# Patient Record
Sex: MALE
[De-identification: names, ages, dates, MRNs, and addresses within clinical notes are randomized per-mention and may not be internally consistent; named-entity substitution may affect disease eponyms.]

---

## 2021-05-26 ENCOUNTER — APPOINTMENT (OUTPATIENT)
Dept: ENDOCRINOLOGY | Facility: CLINIC | Age: 41
End: 2021-05-26
Payer: MEDICAID

## 2021-05-26 VITALS
DIASTOLIC BLOOD PRESSURE: 73 MMHG | HEIGHT: 71 IN | HEART RATE: 60 BPM | SYSTOLIC BLOOD PRESSURE: 124 MMHG | BODY MASS INDEX: 23.24 KG/M2 | WEIGHT: 166 LBS

## 2021-05-26 PROBLEM — Z00.00 ENCOUNTER FOR PREVENTIVE HEALTH EXAMINATION: Status: ACTIVE | Noted: 2021-05-26

## 2021-05-26 LAB — GLUCOSE BLDC GLUCOMTR-MCNC: 254

## 2021-05-26 PROCEDURE — 82962 GLUCOSE BLOOD TEST: CPT

## 2021-05-26 PROCEDURE — 99205 OFFICE O/P NEW HI 60 MIN: CPT | Mod: 25

## 2021-05-27 NOTE — END OF VISIT
[FreeTextEntry3] : All medical record entries made by the Scribe were at my, Dr. Jorge Barraza, direction and personally dictated by me on 05/26/2021. I have reviewed the chart and agree that the record accurately reflects my personal performance of the history, physical exam, assessment and plan. I have also personally directed, reviewed and agreed with the chart.  [Time Spent: ___ minutes] : I have spent [unfilled] minutes of time on the encounter.

## 2021-05-27 NOTE — HISTORY OF PRESENT ILLNESS
[FreeTextEntry1] : 41 y/o M pt, with Hx of T2DM (dx in 2014), referred by Dr. Wale Hollingsworth, presents today to establish endocrine care with me.\par FHx: Heart disease (father)\par SHx: Non smoker. Social EtOH use. No recreational drug use. Works in cleaning and maintenance. \par Lifestyle: Walks qd. Eats 3 meals a day. Checks BS. \par Last [facility] visit: 2015\par \par 05/26/2021\par Pt was hospitalized for a week when he got dx with T2DM in 2014, and he was discharged with insulin. He was taking NovoLog and Lantus 10 u qpm for the first year of dx. He was on Metformin 2 yrs ago which was held in 7/2020 after he started experiencing dizziness. Pt was hospitalized for 3 days two weeks ago because he was feeling weak and vomiting. BS was found to be in 400s and notes it is probably due to "eating wrong things and stressing a lot". He was reportedly advised to adjust Levemir to 10 u and to continue with NovoLog. Pt states that he has been treated like T1DM and wants to know what kind of DM he has. He has never seen an endocrinologist before. \par \par Pt presents today with POCT 254, /73 and BMI 23.15, feeling well with no acute complaints. He reports of nocturia 2x night. \par FBS in 200s. \par Denies weight loss, and  pins and needles sensation in LE. \par \par Current Medications: Levemir 10 u qpm, NovoLog 6 u ac

## 2021-05-27 NOTE — ASSESSMENT
[Carbohydrate Consistent Diet] : carbohydrate consistent diet [Importance of Diet and Exercise] : importance of diet and exercise to improve glycemic control, achieve weight loss and improve cardiovascular health [Exercise/Effect on Glucose] : exercise/effect on glucose [Self Monitoring of Blood Glucose] : self monitoring of blood glucose [FreeTextEntry1] : 39 y/o M pt with:\par \par 1. T1DM, uncontrolled (dx in 2014); no information on DM related complication.\par He works in cleaning and maintenance. He has not see ophthalmologist in more than 4 yrs. He is on low dose MDI. \par Diabetes treatment goals discussed, including target goals for BP, LDL-c, A1c, and body weight. \par Discussed the importance of BS monitoring. Advised pt to monitor pre and 2 hours postprandial BS.  \par Briefly summarized anti-diabetic medications, including benefits and side effects along with insulin kinetics with pt. \par Recommend pt continue present DM management until the assessment of current evaluation is completed. \par Refer pt to see RD and nurse educator for comprehensive DM teachings, including sick days management. \par Will order labs today, including ISA Ab and C-Peptide. \par \par Return in: 1 week

## 2021-05-27 NOTE — ADDENDUM
[FreeTextEntry1] : I, Mitch Kim, acted solely as a scribe for Dr. Jorge Barraza on this date. 05/26/2021.

## 2021-05-27 NOTE — REVIEW OF SYSTEMS
[Negative] : Heme/Lymph [As Noted in HPI] : as noted in HPI [Nocturia] : nocturia [Recent Weight Loss (___ Lbs)] : no recent weight loss [de-identified] : no pins and needles sensation in LE  [de-identified] : n

## 2021-05-27 NOTE — PHYSICAL EXAM
[Alert] : alert [Normal Sclera/Conjunctiva] : normal sclera/conjunctiva [Normal Outer Ear/Nose] : the ears and nose were normal in appearance [Thyroid Not Enlarged] : the thyroid was not enlarged [No Thyroid Nodules] : no palpable thyroid nodules [No Respiratory Distress] : no respiratory distress [Normal S1, S2] : normal S1 and S2 [No Edema] : no peripheral edema [Pedal Pulses Normal] : the pedal pulses are present [Spine Straight] : spine straight [No Stigmata of Cushings Syndrome] : no stigmata of Cushings Syndrome [Normal Gait] : normal gait [Right Foot Was Examined] : right foot ~C was examined [Left Foot Was Examined] : left foot ~C was examined [Normal Reflexes] : deep tendon reflexes were 2+ and symmetric [Oriented x3] : oriented to person, place, and time [Vibration Dec.] : normal vibratory sensation at the level of the toes [Diminished Throughout Both Feet] : normal tactile sensation with monofilament testing throughout both feet [de-identified] : 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

## 2021-05-27 NOTE — REASON FOR VISIT
[Initial Evaluation] : an initial evaluation [DM Type 2] : DM Type 2 [FreeTextEntry2] : Dr. Wale Hollingsworth

## 2021-06-02 ENCOUNTER — APPOINTMENT (OUTPATIENT)
Dept: ENDOCRINOLOGY | Facility: CLINIC | Age: 41
End: 2021-06-02
Payer: MEDICAID

## 2021-06-02 VITALS
SYSTOLIC BLOOD PRESSURE: 120 MMHG | HEIGHT: 71 IN | HEART RATE: 77 BPM | WEIGHT: 160 LBS | BODY MASS INDEX: 22.4 KG/M2 | DIASTOLIC BLOOD PRESSURE: 77 MMHG

## 2021-06-02 LAB
ALBUMIN SERPL ELPH-MCNC: 5.1 G/DL
ALP BLD-CCNC: 98 U/L
ALT SERPL-CCNC: 36 U/L
ANION GAP SERPL CALC-SCNC: 12 MMOL/L
AST SERPL-CCNC: 33 U/L
BILIRUB SERPL-MCNC: 0.4 MG/DL
BUN SERPL-MCNC: 17 MG/DL
C PEPTIDE SERPL-MCNC: 0.1 NG/ML
CALCIUM SERPL-MCNC: 10.4 MG/DL
CHLORIDE SERPL-SCNC: 99 MMOL/L
CHOLEST SERPL-MCNC: 160 MG/DL
CO2 SERPL-SCNC: 31 MMOL/L
CREAT SERPL-MCNC: 0.85 MG/DL
CREAT SPEC-SCNC: 67 MG/DL
ESTIMATED AVERAGE GLUCOSE: 272 MG/DL
FOLATE SERPL-MCNC: 17.8 NG/ML
GAD65 AB SER-MCNC: 0.01 NMOL/L
GLUCOSE SERPL-MCNC: 95 MG/DL
HBA1C MFR BLD HPLC: 11.1 %
HDLC SERPL-MCNC: 72 MG/DL
LDLC SERPL CALC-MCNC: 76 MG/DL
MICROALBUMIN 24H UR DL<=1MG/L-MCNC: <1.2 MG/DL
MICROALBUMIN/CREAT 24H UR-RTO: NORMAL MG/G
NONHDLC SERPL-MCNC: 89 MG/DL
POTASSIUM SERPL-SCNC: 4.5 MMOL/L
PROT SERPL-MCNC: 8 G/DL
SODIUM SERPL-SCNC: 143 MMOL/L
TRIGL SERPL-MCNC: 63 MG/DL
TSH SERPL-ACNC: 1.17 UIU/ML
VIT B12 SERPL-MCNC: 1572 PG/ML

## 2021-06-02 PROCEDURE — 99215 OFFICE O/P EST HI 40 MIN: CPT | Mod: 25

## 2021-06-02 PROCEDURE — 82962 GLUCOSE BLOOD TEST: CPT

## 2021-06-03 LAB — GLUCOSE BLDC GLUCOMTR-MCNC: 493

## 2021-06-03 NOTE — ASSESSMENT
[FreeTextEntry1] : 41 y/o M pt with:\par \par 1. DM (dx in 2014); \par ISA Ab is negative. C-Peptide 0.1 with Glucose of 95. No immunomarker evidence of pt being T1DM. \par Pt continues to be in glucose toxicity. POCT 493 today. He is clinically stable. He received 6u of Novolog this morning with breakfast and was given additional  10 u x1 at the office. Will contact pt later this evening to assess his response. \par Diabetes mellitus treatment goals discussed\par Gave pt his new MDI regimen. Hold Levemir. Will initiate pt on Basaglar 20 u qpm. Increase NovoLog to 12 u ac +ss:\par BS <100: 0 u\par -150: +2 u\par -200 +3 iu\par -250: +5 u\par -300: +6 u\par BS >300: +8 u\par Recommend pt to see RD. \par \par Return in: 1 month [Carbohydrate Consistent Diet] : carbohydrate consistent diet [Importance of Diet and Exercise] : importance of diet and exercise to improve glycemic control, achieve weight loss and improve cardiovascular health [Exercise/Effect on Glucose] : exercise/effect on glucose [Self Monitoring of Blood Glucose] : self monitoring of blood glucose

## 2021-06-03 NOTE — REVIEW OF SYSTEMS
[Negative] : Heme/Lymph [Fatigue] : fatigue [As Noted in HPI] : as noted in HPI [Nocturia] : nocturia [Recent Weight Loss (___ Lbs)] : no recent weight loss [de-identified] : no pins and needles sensation in LE

## 2021-06-03 NOTE — END OF VISIT
[FreeTextEntry3] : All medical record entries made by the Scribe were at my, Dr. Jorge Barraza, direction and personally dictated by me on 06/02/2021. I have reviewed the chart and agree that the record accurately reflects my personal performance of the history, physical exam, assessment and plan. I have also personally directed, reviewed and agreed with the chart.  [Time Spent: ___ minutes] : I have spent [unfilled] minutes of time on the encounter.

## 2021-06-03 NOTE — ADDENDUM
[FreeTextEntry1] : I, Mitch Kim, acted solely as a scribe for Dr. Jorge Barraza on this date. 06/02/2021.

## 2021-06-03 NOTE — HISTORY OF PRESENT ILLNESS
[FreeTextEntry1] : 41 y/o M pt, with Hx of DM (dx in 2014). \par FHx: Heart disease (father)\par SHx: Non smoker. Social EtOH use. No recreational drug use. Works in cleaning and maintenance till 12AM. \par Lifestyle: Walks qd. Eats 3 meals a day. Checks BS. \par Last [facility] visit: 2015\par \par 05/26/2021\par Pt was hospitalized for a week when he got dx with T2DM in 2014, and he was discharged with insulin. He was taking NovoLog and Lantus 10 u qpm for the first year of dx. He was on Metformin 2 yrs ago which was held in 7/2020 after he started experiencing dizziness. Pt was hospitalized for 3 days two weeks ago because he was feeling weak and vomiting. BS was found to be in 400s and notes it is probably due to "eating wrong  things and stressing a lot". He was reportedly advised to adjust Levemir to 10 u and to continue with NovoLog. Pt states that he has been treated like T1DM and wants to know what kind of DM he has. He has never seen an endocrinologist before. \par Pt presents today with POCT 254, /73 and BMI 23.15, feeling well with no acute complaints. He reports of nocturia 2x night. \par FBS in 200s. \par Denies weight loss, and pins and needles sensation in LE. \par \par 06/02/2021\par Pt presents today with POCT 493, /77 and BMI 22.32 for DM f/u. He states that he has been stressed lately due to family issues. He has been eating large amount for meals. He has lost 6 lbs in last 1 months. \par Pt brought his BS record. FBS: 316, 328, 369, 531, 156, 268. 2hrs PPBS ranges between 190-mid 300s. \par \par Current Medications: Levemir 10 u qpm, NovoLog 6 u ac \par \par Labs:\par - 05/26/21: A1c 11.1%, C-Peptide 0.1 (L), ISA Ab 0.01, s.creat 0.85, LDL-c 76, TSH 1.17

## 2021-06-03 NOTE — PHYSICAL EXAM
[Alert] : alert [Normal Sclera/Conjunctiva] : normal sclera/conjunctiva [Normal Outer Ear/Nose] : the ears and nose were normal in appearance [Thyroid Not Enlarged] : the thyroid was not enlarged [No Thyroid Nodules] : no palpable thyroid nodules [No Respiratory Distress] : no respiratory distress [Normal S1, S2] : normal S1 and S2 [No Edema] : no peripheral edema [Pedal Pulses Normal] : the pedal pulses are present [Spine Straight] : spine straight [No Stigmata of Cushings Syndrome] : no stigmata of Cushings Syndrome [Normal Gait] : normal gait [Right Foot Was Examined] : right foot ~C was examined [Left Foot Was Examined] : left foot ~C was examined [Normal Reflexes] : deep tendon reflexes were 2+ and symmetric [Oriented x3] : oriented to person, place, and time [Vibration Dec.] : normal vibratory sensation at the level of the toes [Diminished Throughout Both Feet] : normal tactile sensation with monofilament testing throughout both feet [de-identified] : 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

## 2021-06-04 ENCOUNTER — APPOINTMENT (OUTPATIENT)
Dept: ENDOCRINOLOGY | Facility: CLINIC | Age: 41
End: 2021-06-04
Payer: MEDICAID

## 2021-06-04 PROCEDURE — 97802 MEDICAL NUTRITION INDIV IN: CPT | Mod: 95

## 2021-06-28 ENCOUNTER — APPOINTMENT (OUTPATIENT)
Dept: ENDOCRINOLOGY | Facility: CLINIC | Age: 41
End: 2021-06-28
Payer: MEDICAID

## 2021-07-06 ENCOUNTER — APPOINTMENT (OUTPATIENT)
Dept: ENDOCRINOLOGY | Facility: CLINIC | Age: 41
End: 2021-07-06

## 2021-07-13 ENCOUNTER — APPOINTMENT (OUTPATIENT)
Dept: ENDOCRINOLOGY | Facility: CLINIC | Age: 41
End: 2021-07-13
Payer: MEDICAID

## 2021-07-13 VITALS
SYSTOLIC BLOOD PRESSURE: 118 MMHG | DIASTOLIC BLOOD PRESSURE: 73 MMHG | BODY MASS INDEX: 24.13 KG/M2 | WEIGHT: 173 LBS | HEART RATE: 68 BPM

## 2021-07-13 PROCEDURE — 99214 OFFICE O/P EST MOD 30 MIN: CPT | Mod: 25

## 2021-07-13 PROCEDURE — 82962 GLUCOSE BLOOD TEST: CPT

## 2021-07-14 ENCOUNTER — APPOINTMENT (OUTPATIENT)
Dept: ENDOCRINOLOGY | Facility: CLINIC | Age: 41
End: 2021-07-14
Payer: MEDICAID

## 2021-07-14 PROCEDURE — 97803 MED NUTRITION INDIV SUBSEQ: CPT

## 2021-07-15 NOTE — ASSESSMENT
[Carbohydrate Consistent Diet] : carbohydrate consistent diet [Importance of Diet and Exercise] : importance of diet and exercise to improve glycemic control, achieve weight loss and improve cardiovascular health [Exercise/Effect on Glucose] : exercise/effect on glucose [Self Monitoring of Blood Glucose] : self monitoring of blood glucose [FreeTextEntry1] : 41 y/o M pt with:\par \par 1. DM (dx in 2014); \par ISA Ab is negative. C-Peptide 0.1 with Glucose of 95. No immunomarker evidence of pt being T1DM. \par On 5/26/21, A1c was 11.1%. Pt was placed on MDI regimen that includes Basaglar 20 u qpm and NovoLog 12 u ac +ss for BS above 100, up to 20u\par Pt has shown very limited understanding of DM, daily caloric intake, and energy expenditure. \par I reviewed pt's BS records over the past two weeks and 80% of the readings are above 200(occasional readings in the 400s). FBS is in the 180s. On three occasions, readings were below 100(including a reading of 60 mg/dL). \par Pt works in in maintenance from 4pm-1am. Pt acknowledges that his lifestyle and diet is not what he would like it to be. Pt denies abdominal pain and GI disturbances. \par Plan: Pt is to switch the site of insulin injection, have meals on schedule as often as possible, and use present MDI regimen. \par Pt will call us in the next 3 days with BS readings. He will have an appointment with the RD tomorrow. \par \par Return: 3 weeks.

## 2021-07-15 NOTE — END OF VISIT
[FreeTextEntry3] : All medical record entries made by the Scribe were at my, Dr. Jorge Barraza, direction and personally dictated by me on 07/13/2021. I have reviewed the chart and agree that the record accurately reflects my personal performance of the history, physical exam, assessment and plan. I have also personally directed, reviewed and agreed with the chart.

## 2021-07-15 NOTE — ADDENDUM
[FreeTextEntry1] : I, Phoebe Farias, acted solely as a scribe for Dr. Jorge Barraza on this date. 07/13/2021.

## 2021-07-15 NOTE — PHYSICAL EXAM
[Alert] : alert [Normal Sclera/Conjunctiva] : normal sclera/conjunctiva [Normal Outer Ear/Nose] : the ears and nose were normal in appearance [Thyroid Not Enlarged] : the thyroid was not enlarged [No Thyroid Nodules] : no palpable thyroid nodules [No Respiratory Distress] : no respiratory distress [Normal S1, S2] : normal S1 and S2 [No Edema] : no peripheral edema [Pedal Pulses Normal] : the pedal pulses are present [Spine Straight] : spine straight [No Stigmata of Cushings Syndrome] : no stigmata of Cushings Syndrome [Normal Gait] : normal gait [Right Foot Was Examined] : right foot ~C was examined [Left Foot Was Examined] : left foot ~C was examined [Normal Reflexes] : deep tendon reflexes were 2+ and symmetric [Oriented x3] : oriented to person, place, and time [Vibration Dec.] : normal vibratory sensation at the level of the toes [Diminished Throughout Both Feet] : normal tactile sensation with monofilament testing throughout both feet [de-identified] : 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

## 2021-07-15 NOTE — REVIEW OF SYSTEMS
[Negative] : Heme/Lymph [Recent Weight Loss (___ Lbs)] : no recent weight loss [Abdominal Pain] : no abdominal pain

## 2021-07-15 NOTE — HISTORY OF PRESENT ILLNESS
[FreeTextEntry1] : 39 y/o M pt, with Hx of DM (dx in 2014). \par FHx: Heart disease (father)\par SHx: Non smoker. Social EtOH use. No recreational drug use. Works in cleaning and maintenance till 12AM. \par Lifestyle: Walks qd. Eats 3 meals a day. Checks BS. \par Last [facility] visit: 2015\par \par 05/26/2021\par Pt was hospitalized for a week when he got dx with T2DM in 2014, and he was discharged with insulin. He was taking NovoLog and Lantus 10 u qpm for the first year of dx. He was on Metformin 2 yrs ago which was held in 7/2020 after he started experiencing dizziness. Pt was hospitalized for 3 days two weeks ago because he was feeling weak and vomiting. BS was found to be in 400s and notes it is probably due to "eating wrong  things and stressing a lot". He was reportedly advised to adjust Levemir to 10 u and to continue with NovoLog. Pt states that he has been treated like T1DM and wants to know what kind of DM he has. He has never seen an endocrinologist before. \par Pt presents today with POCT 254, /73 and BMI 23.15, feeling well with no acute complaints. He reports of nocturia 2x night. \par FBS in 200s. \par Denies weight loss, and pins and needles sensation in LE. \par \par 06/02/2021\par Pt presents today with POCT 493, /77 and BMI 22.32 for DM f/u. He states that he has been stressed lately due to family issues. He has been eating large amount for meals. He has lost 6 lbs in last 1 months. \par Pt brought his BS record. FBS: 316, 328, 369, 531, 156, 268. 2hrs PPBS ranges between 190-mid 300s. \par \par 06/13/2021\par Pt presents today with POCT 347, /73, and BMI 24.13 for DM f/u. He is feeling well but reports BS readings fluctuate and believes it is because of the way he is eating. Denies abdominal pain, weight loss. On Saturday (7/10/21) pt took a pill "to clean intestines" as per pt, which caused diarrhea. \par Pt brought BS meter. Most recent readings are: 12am: 309, 4pm: 267, 5:20am: 150, 1am: 290. 4:38pm: 62, 1am: 494(7/8/2021), 4pm: 61(7/6/21), 6:40am: 100 (7/4/21), 7am: 56(7/3/21). \par Pt reports highest BS was in the 500s and says he ran out of test strips but will get more today. Pt thinks he needs to exercise more and that it is possible he is eating too much. He reports when he eats a small plate, he is not full. Pt believes he eats enough for his height as per pt. He will have an appointment with the nutritionist tomorrow. \par Pt takes Basaglar 20u. Pt takes insulin tid, at 7am, 12pm, and 8pm. Pt sometimes eats at 1am and takes insulin. Pt eats lunch at 12pm and dinner at 8pm. Last night with dinner pt took NovoLog 12u. Pt slept for only 3 hours last night from 2am-6am. He had breakfast this morning at 6:30am and took NovoLog 12u. 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 Pt reports he worries about his son and his son's mother who has medical issues concerning her spine. When pt gets home at 1am, he can't immediately fall asleep. He worries about his son and his mother which prevents him from immediately going to sleep. Pt does not live with son and son's mother. Pt lives with his mother, who often cooks for him. \par Pt has never seen a psychologist or therapist, but pt reports he feels depressed. Pt wishes he did not have the issues related to DM. Having DM stresses the pt out. Pt says it is a "bother to have to prick himself" as per pt. \par Denies use of cigarettes, marijuana, cocaine, amphetamine, substances to increase stamina or muscle mass. Pt only drinks wine on special occasions. \par \par Current Medications: Levemir 10 u qpm, NovoLog 12 u ac \par \par Labs:\par - 05/26/21: A1c 11.1%, C-Peptide 0.1 (L), ISA Ab 0.01, s.creat 0.85, LDL-c 76, TSH 1.17

## 2021-07-15 NOTE — THERAPY
[Today's Date] : [unfilled] [Basaglar] : Basaglar [Novolog] : Novolog [FreeTextEntry9] : 20 u qpm [de-identified] : 12 u ac + ss

## 2021-07-27 ENCOUNTER — APPOINTMENT (OUTPATIENT)
Dept: ENDOCRINOLOGY | Facility: CLINIC | Age: 41
End: 2021-07-27
Payer: COMMERCIAL

## 2021-07-27 ENCOUNTER — RESULT CHARGE (OUTPATIENT)
Age: 41
End: 2021-07-27

## 2021-07-27 VITALS
DIASTOLIC BLOOD PRESSURE: 73 MMHG | WEIGHT: 174 LBS | HEART RATE: 68 BPM | BODY MASS INDEX: 24.27 KG/M2 | SYSTOLIC BLOOD PRESSURE: 112 MMHG

## 2021-07-27 LAB — GLUCOSE BLDC GLUCOMTR-MCNC: 347

## 2021-07-27 PROCEDURE — 97803 MED NUTRITION INDIV SUBSEQ: CPT

## 2021-07-27 PROCEDURE — 82962 GLUCOSE BLOOD TEST: CPT

## 2021-07-27 PROCEDURE — 99214 OFFICE O/P EST MOD 30 MIN: CPT | Mod: 25

## 2021-07-27 PROCEDURE — 99072 ADDL SUPL MATRL&STAF TM PHE: CPT

## 2021-07-29 LAB — GLUCOSE BLDC GLUCOMTR-MCNC: 112

## 2021-07-29 NOTE — PHYSICAL EXAM
[Alert] : alert [Normal Sclera/Conjunctiva] : normal sclera/conjunctiva [Normal Outer Ear/Nose] : the ears and nose were normal in appearance [Thyroid Not Enlarged] : the thyroid was not enlarged [No Thyroid Nodules] : no palpable thyroid nodules [No Respiratory Distress] : no respiratory distress [Normal S1, S2] : normal S1 and S2 [No Edema] : no peripheral edema [Pedal Pulses Normal] : the pedal pulses are present [Spine Straight] : spine straight [No Stigmata of Cushings Syndrome] : no stigmata of Cushings Syndrome [Normal Gait] : normal gait [Right Foot Was Examined] : right foot ~C was examined [Left Foot Was Examined] : left foot ~C was examined [Normal Reflexes] : deep tendon reflexes were 2+ and symmetric [Oriented x3] : oriented to person, place, and time [Vibration Dec.] : normal vibratory sensation at the level of the toes [Diminished Throughout Both Feet] : normal tactile sensation with monofilament testing throughout both feet [de-identified] : 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

## 2021-07-29 NOTE — HISTORY OF PRESENT ILLNESS
[FreeTextEntry1] : 39 y/o M pt, with Hx of DM (dx in 2014). \par FHx: Heart disease (father)\par SHx: Non smoker. Social EtOH use. No recreational drug use. Works in cleaning and maintenance till 12AM. \par Lifestyle: Walks qd. Eats 3 meals a day. Checks BS. \par Last [facility] visit: 2015\par \par 05/26/2021\par Pt was hospitalized for a week when he got dx with T2DM in 2014, and he was discharged with insulin. He was taking NovoLog and Lantus 10 u qpm for the first year of dx. He was on Metformin 2 yrs ago which was held in 7/2020 after he started experiencing dizziness. Pt was hospitalized for 3 days two weeks ago because he was feeling weak and vomiting. BS was found to be in 400s and notes it is probably due to "eating wrong  things and stressing a lot". He was reportedly advised to adjust Levemir to 10 u and to continue with NovoLog. Pt states that he has been treated like T1DM and wants to know what kind of DM he has. He has never seen an endocrinologist before. \par Pt presents today with POCT 254, /73 and BMI 23.15, feeling well with no acute complaints. He reports of nocturia 2x night. \par FBS in 200s. \par Denies weight loss, and pins and needles sensation in LE. \par \par 06/02/2021\par Pt presents today with POCT 493, /77 and BMI 22.32 for DM f/u. He states that he has been stressed lately due to family issues. He has been eating large amount for meals. He has lost 6 lbs in last 1 months. \par Pt brought his BS record. FBS: 316, 328, 369, 531, 156, 268. 2hrs PPBS ranges between 190-mid 300s. \par \par 07/13/2021\par Pt presents today with POCT 347, /73, and BMI 24.13 for DM f/u. He is feeling well but reports BS readings fluctuate and believes it is because of the way he is eating. Denies abdominal pain, weight loss. On Saturday (7/10/21) pt took a pill "to clean intestines" as per pt, which caused diarrhea. \par Pt brought BS meter. Most recent readings are: 12am: 309, 4pm: 267, 5:20am: 150, 1am: 290. 4:38pm: 62, 1am: 494(7/8/2021), 4pm: 61(7/6/21), 6:40am: 100 (7/4/21), 7am: 56(7/3/21). \par Pt reports highest BS was in the 500s and says he ran out of test strips but will get more today. Pt thinks he needs to exercise more and that it is possible he is eating too much. He reports when he eats a small plate, he is not full. Pt believes he eats enough for his height as per pt. He will have an appointment with the nutritionist tomorrow. \par Pt takes Basaglar 20u. Pt takes insulin tid, at 7am, 12pm, and 8pm. Pt sometimes eats at 1am and takes insulin. Pt eats lunch at 12pm and dinner at 8pm. Last night with dinner pt took NovoLog 12u. Pt slept for only 3 hours last night from 2am-6am. He had breakfast this morning at 6:30am and took NovoLog 12u. 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 Pt reports he worries about his son and his son's mother who has medical issues concerning her spine. When pt gets home at 1am, he can't immediately fall asleep. He worries about his son and his mother which prevents him from immediately going to sleep. Pt does not live with son and son's mother. Pt lives with his mother, who often cooks for him. \par Pt has never seen a psychologist or therapist, but pt reports he feels depressed. Pt wishes he did not have the issues related to DM. Having DM stresses the pt out. Pt says it is a "bother to have to prick himself" as per pt. \par Denies use of cigarettes, marijuana, cocaine, amphetamine, substances to increase stamina or muscle mass. Pt only drinks wine on special occasions. \par \par 07/27/2021\par Pt presents today with POCT 112, /73, and BMI 24.27 for DM f/u. He is feeling clinically unchanged. \par Pt saw the nutritionist today and discussed carbohydrate intake with her. Pt tends to have greatest carbohydrate intake at dinner time. For breakfast at 9am, he has waffles. Pt notes he is eating too many waffles, around 3 waffles. Pt has lunch at 2pm. He tries to have dinner at 8pm, but sometimes is unable to take a break during work to eat. Pt works two jobs with the same company. First shift is 4am-6pm. Then, he goes to second job and finishes at 1-2am. \par BS readings have been very low recently, dropping down to 60s and 70s. Low BS occurs 3 times per week. Pt brought BS monitor. \par Recent BS readings: \par - 7/23/21: 68 at 4:23pm\par - 7/24/21: 60 at 2:40am, 60 at 3:20pm \par - 7/25/21: 60 at 7:16am, 433 at 1:39 pm \par - Most recent: 317 at 12:45am \par \par Current Medications: Levemir 10 u qpm, NovoLog 12 u ac \par \par Labs:\par - 05/26/21: A1c 11.1%, C-Peptide 0.1 (L), ISA Ab 0.01, s.creat 0.85, LDL-c 76, TSH 1.17

## 2021-07-29 NOTE — END OF VISIT
[FreeTextEntry3] : All medical record entries made by the Scribe were at my, Dr. Jorge Barraza, direction and personally dictated by me on 07/27/2021. I have reviewed the chart and agree that the record accurately reflects my personal performance of the history, physical exam, assessment and plan. I have also personally directed, reviewed and agreed with the chart.  [Time Spent: ___ minutes] : I have spent [unfilled] minutes of time on the encounter.

## 2021-07-29 NOTE — ASSESSMENT
[Carbohydrate Consistent Diet] : carbohydrate consistent diet [Importance of Diet and Exercise] : importance of diet and exercise to improve glycemic control, achieve weight loss and improve cardiovascular health [Exercise/Effect on Glucose] : exercise/effect on glucose [Hypoglycemia Management] : hypoglycemia management [Self Monitoring of Blood Glucose] : self monitoring of blood glucose [FreeTextEntry1] : 39 y/o M pt with:\par \par 1. DM (dx in 2014); \par No immune markers evident for T1DM with very low c-peptide. Continue assessing for DM complications. \par Overall, pt has improved, but needs to work on consistency. \par Hypoglycemia prevention and treatment explained to pt. Continue with MDI provided in 06/2/21:  \par Basaglar 20 u qpm. NovoLog 12 u ac +ss:\par BS <100: 0 u\par -150: +2 u\par -200 +3 iu\par -250: +5 u\par -300: +6 u\par BS >300: +8 u\par Pt saw nutritionist today. \par \par Return: 3 months.

## 2021-10-28 ENCOUNTER — RESULT CHARGE (OUTPATIENT)
Age: 41
End: 2021-10-28

## 2021-10-28 ENCOUNTER — APPOINTMENT (OUTPATIENT)
Dept: ENDOCRINOLOGY | Facility: CLINIC | Age: 41
End: 2021-10-28
Payer: MEDICAID

## 2021-10-28 VITALS
HEART RATE: 75 BPM | SYSTOLIC BLOOD PRESSURE: 125 MMHG | BODY MASS INDEX: 24.69 KG/M2 | DIASTOLIC BLOOD PRESSURE: 73 MMHG | WEIGHT: 177 LBS

## 2021-10-28 PROCEDURE — 99214 OFFICE O/P EST MOD 30 MIN: CPT | Mod: 25

## 2021-10-28 PROCEDURE — 82962 GLUCOSE BLOOD TEST: CPT

## 2021-10-29 NOTE — ASSESSMENT
[Carbohydrate Consistent Diet] : carbohydrate consistent diet [Importance of Diet and Exercise] : importance of diet and exercise to improve glycemic control, achieve weight loss and improve cardiovascular health [Exercise/Effect on Glucose] : exercise/effect on glucose [Hypoglycemia Management] : hypoglycemia management [Self Monitoring of Blood Glucose] : self monitoring of blood glucose [FreeTextEntry1] : 39 y/o M pt with:\par \par 1. DM Uncontrol.  (dx in 2014):\par Patient seems not to have clear understanding of diabetes mellitus and treatment goals, or using insulin.\par We have discussed extensively about all the above, and we will continue same treatment approach.\par Suggested patient to bring family member(s) to next appointment to help form team to assist patient with his treatment goals.\par Continue assessing for DM complications.\par Continue with MDI provided in 06/2/21:  \par Levemir 20 u qpm. NovoLog 12 u ac +ss:\par BS <100: 0 u\par -150: +2 u\par -200 +3 u\par -250: +5 u\par -300: +6 u\par BS >300: +8 u\par \par Return in 2 months.

## 2021-10-29 NOTE — ADDENDUM
[FreeTextEntry1] : All medical record entries made by the Scribe were at my, Dr. Jorge Barraza, direction and personally dictated by me on 10/28/2021. I have reviewed the chart and agree that the record accurately reflects my personal performance of the history, physical exam, assessment and plan. I have also personally directed, reviewed, and agreed with the chart.

## 2021-10-29 NOTE — HISTORY OF PRESENT ILLNESS
[FreeTextEntry1] : 39 y/o M pt, with Hx of DM (dx in 2014). \par FHx: Heart disease (father)\par SHx: Non smoker. Social EtOH use. No recreational drug use. Works in cleaning and maintenance till 12AM. \par Lifestyle: Walks qd. Eats 3 meals a day. Checks BS. \par Last [facility] visit: 2015\par \par 07/27/2021\par Pt presents today with POCT 112, /73, and BMI 24.27 for DM f/u. He is feeling clinically unchanged. \par Pt saw the nutritionist today and discussed carbohydrate intake with her. Pt tends to have greatest carbohydrate intake at dinner time. For breakfast at 9am, he has waffles. Pt notes he is eating too many waffles, around 3 waffles. Pt has lunch at 2pm. He tries to have dinner at 8pm, but sometimes is unable to take a break during work to eat. Pt works two jobs with the same company. First shift is 4am-6pm. Then, he goes to second job and finishes at 1-2am. \par BS readings have been very low recently, dropping down to 60s and 70s. Low BS occurs 3 times per week. Pt brought BS monitor. \par Recent BS readings: \par - 7/23/21: 68 at 4:23pm\par - 7/24/21: 60 at 2:40am, 60 at 3:20pm \par - 7/25/21: 60 at 7:16am, 433 at 1:39 pm \par - Most recent: 317 at 12:45am \par \par 10/28/2021\par Pt presents today feeling generally well. His current BP is 125/73 and has a BMI of 24.69. Pt is a maintenance  from 7pm to 2am. His diabetes is poorly controlled. Took 12 u of Novolog this morning with breakfast.\par He had BS reading this morning of 250. Prior to this, 2 days ago had reading of 108, in morning readings of 308 and 345. 3 days ago: 284, 53.\par Patient lives with his mother and brother.\par Has seen a nutritionist since last visit.\par \par Current Medications: Levemir 20 u qpm, NovoLog 12 u ac \par \par Labs:\par - 10/28/21: POCT-glucose 181\par - 07/27/21: POCT-glucose 112\par - 05/26/21: A1c 11.1%, C-Peptide 0.1 (L), ISA Ab 0.01, s.creat 0.85, LDL-c 76, TSH 1.17

## 2021-10-29 NOTE — PHYSICAL EXAM
[Alert] : alert [Normal Sclera/Conjunctiva] : normal sclera/conjunctiva [Normal Outer Ear/Nose] : the ears and nose were normal in appearance [Thyroid Not Enlarged] : the thyroid was not enlarged [No Thyroid Nodules] : no palpable thyroid nodules [No Respiratory Distress] : no respiratory distress [Normal S1, S2] : normal S1 and S2 [No Edema] : no peripheral edema [Pedal Pulses Normal] : the pedal pulses are present [Spine Straight] : spine straight [No Stigmata of Cushings Syndrome] : no stigmata of Cushings Syndrome [Normal Gait] : normal gait [Normal Reflexes] : deep tendon reflexes were 2+ and symmetric [Oriented x3] : oriented to person, place, and time [Vibration Dec.] : normal vibratory sensation at the level of the toes [Diminished Throughout Both Feet] : normal tactile sensation with monofilament testing throughout both feet [de-identified] : 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

## 2021-10-29 NOTE — END OF VISIT
[Time Spent: ___ minutes] : I have spent [unfilled] minutes of time on the encounter. [FreeTextEntry3] : Documented by Darryn Zimmerman acting as a scribe for Dr. Jorge Barraza on 10/28/2021.

## 2021-12-28 ENCOUNTER — APPOINTMENT (OUTPATIENT)
Dept: ENDOCRINOLOGY | Facility: CLINIC | Age: 41
End: 2021-12-28
Payer: MEDICAID

## 2021-12-28 DIAGNOSIS — E11.65 TYPE 2 DIABETES MELLITUS WITH HYPERGLYCEMIA: ICD-10-CM

## 2021-12-28 PROCEDURE — 99214 OFFICE O/P EST MOD 30 MIN: CPT | Mod: 95

## 2021-12-28 NOTE — REASON FOR VISIT
[Home] : at home, [unfilled] , at the time of the visit. [Medical Office: ([facility])___] : at the medical office located in  [Verbal consent obtained from patient] : the patient, [unfilled] [Follow - Up] : a follow-up visit [DM Type 2] : DM Type 2 [Parent] : parent

## 2021-12-30 PROBLEM — E11.65 UNCONTROLLED DIABETES MELLITUS: Status: ACTIVE | Noted: 2021-05-26

## 2021-12-30 NOTE — END OF VISIT
[FreeTextEntry3] : All medical record entries made by the Scribe were at my, Dr. Jorge Barraza, direction and personally dictated by me on 12/28/2021. I have reviewed the chart and agree that the record accurately reflects my personal performance of the history, physical exam, assessment and plan. I have also personally directed, reviewed and agreed with the chart.  [Time Spent: ___ minutes] : I have spent [unfilled] minutes of time on the encounter.

## 2021-12-30 NOTE — HISTORY OF PRESENT ILLNESS
[FreeTextEntry1] : 40 y/o M pt, with Hx of DM (dx in 2014). \par FHx: Heart disease (father)\par SHx: Non smoker. Social EtOH use. No recreational drug use. Works in cleaning and maintenance till 12AM. \par Lifestyle: Walks qd. Eats 3 meals a day. Checks BS. Has 2 jobs. \par Last [facility] visit: 2015\par \par 07/27/2021\par Pt presents today with POCT 112, /73, and BMI 24.27 for DM f/u. He is feeling clinically unchanged. \par Pt saw the nutritionist today and discussed carbohydrate intake with her. Pt tends to have greatest carbohydrate intake at dinner time. For breakfast at 9am, he has waffles. Pt notes he is eating too many waffles, around 3 waffles. Pt has lunch at 2pm. He tries to have dinner at 8pm, but sometimes is unable to take a break during work to eat. Pt works two jobs with the same company. First shift is 4am-6pm. Then, he goes to second job and finishes at 1-2am. \par BS readings have been very low recently, dropping down to 60s and 70s. Low BS occurs 3 times per week. Pt brought BS monitor. \par Recent BS readings: \par - 7/23/21: 68 at 4:23pm\par - 7/24/21: 60 at 2:40am, 60 at 3:20pm \par - 7/25/21: 60 at 7:16am, 433 at 1:39 pm \par - Most recent: 317 at 12:45am \par \par 10/28/2021\par Pt presents today feeling generally well. His current BP is 125/73 and has a BMI of 24.69. Pt is a maintenance  from 7pm to 2am. His diabetes is poorly controlled. Took 12 u of Novolog this morning with breakfast.\par He had BS reading this morning of 250. Prior to this, 2 days ago had reading of 108, in morning readings of 308 and 345. 3 days ago: 284, 53.\par Patient lives with his mother and brother.\par Has seen a nutritionist since last visit.\par \par 12/28/2021\par Pt did not have any questions prior to the initiation of the telehealth visit. \par Pt presents today via televideo feeling well. He has been taking Levemir 20 u qd and NovoLog 12 u ac. FBS over the past 3 days: 429 (today), 276 (yesterday), 463 (12/26). This morning pt took Novolog 12 u at 9am and had oatmeal for breakfast. Pt acknowledges that he should have cleared up his confusion with using the sliding scale. Pt has been having 3 meals a day with snacks. He generally has breakfast around 9am, lunch at 2pm, and dinner at 9pm. He is working 2 jobs now and lives with his mother. Pt's mother states she does not know why her son is not doing well with DM. \par \par Current Medications: Levemir 20 u qpm, NovoLog 12 u ac \par \par Labs:\par - 10/28/21: POCT-glucose 181\par - 07/27/21: POCT-glucose 112\par - 05/26/21: A1c 11.1%, C-Peptide 0.1 (L), ISA Ab 0.01, s.creat 0.85, LDL-c 76, TSH 1.17

## 2021-12-30 NOTE — ADDENDUM
[FreeTextEntry1] : I, Phoebe Fraias, acted solely as a scribe for Dr. Jorge Barraza on this date. 12/28/2021.

## 2021-12-30 NOTE — ASSESSMENT
[Carbohydrate Consistent Diet] : carbohydrate consistent diet [Importance of Diet and Exercise] : importance of diet and exercise to improve glycemic control, achieve weight loss and improve cardiovascular health [Exercise/Effect on Glucose] : exercise/effect on glucose [Hypoglycemia Management] : hypoglycemia management [Self Monitoring of Blood Glucose] : self monitoring of blood glucose [FreeTextEntry1] : 40 y/o M pt with:\par \par 1. DM, uncontrolled  (dx in 2014):\par Pt is feeling well and denies osmotic diuresis symptoms. \par Pt states he has a new job, so he is now working 2 jobs. BS has not improved, FBS over the past 24 hours was in the 200s and this morning FBS was in the 400s. \par Pt is not using the insulin regimen that we extensively discussed at his last visit. We again reviewed his insulin regimen and I spoke with his mother to try to find out the cause of pt's poor compliance. \par Pt assured me that he will put more effort into using the regimen as outlined and agreed that if he does not feel better, he will go to the emergency room. \par Increase Levemir from 20 u qd to 30 u qd. \par \par Return in 1 month.

## 2022-01-05 RX ORDER — INSULIN GLARGINE 100 [IU]/ML
100 INJECTION, SOLUTION SUBCUTANEOUS
Qty: 1 | Refills: 0 | Status: DISCONTINUED | COMMUNITY
Start: 2021-06-02 | End: 2022-01-05

## 2022-02-09 ENCOUNTER — APPOINTMENT (OUTPATIENT)
Dept: ENDOCRINOLOGY | Facility: CLINIC | Age: 42
End: 2022-02-09

## 2022-06-27 RX ORDER — INSULIN DETEMIR 100 [IU]/ML
100 INJECTION, SOLUTION SUBCUTANEOUS
Qty: 1 | Refills: 2 | Status: ACTIVE | COMMUNITY
Start: 2022-01-05 | End: 1900-01-01

## 2022-06-27 RX ORDER — INSULIN ASPART 100 [IU]/ML
100 INJECTION, SOLUTION INTRAVENOUS; SUBCUTANEOUS
Qty: 3 | Refills: 1 | Status: ACTIVE | COMMUNITY
Start: 2021-10-01 | End: 1900-01-01

## 2022-07-05 LAB — GLUCOSE BLDC GLUCOMTR-MCNC: 181

## 2022-08-10 ENCOUNTER — APPOINTMENT (OUTPATIENT)
Dept: ENDOCRINOLOGY | Facility: CLINIC | Age: 42
End: 2022-08-10

## 2022-11-07 NOTE — ADDENDUM
[FreeTextEntry1] : I, Phoebe Farias, acted solely as a scribe for Dr. Jorge Barraza on this date. 07/27/2021.  independent

## 2023-04-17 ENCOUNTER — APPOINTMENT (OUTPATIENT)
Dept: ENDOCRINOLOGY | Facility: CLINIC | Age: 43
End: 2023-04-17